# Patient Record
Sex: MALE | Race: BLACK OR AFRICAN AMERICAN | NOT HISPANIC OR LATINO | URBAN - METROPOLITAN AREA
[De-identification: names, ages, dates, MRNs, and addresses within clinical notes are randomized per-mention and may not be internally consistent; named-entity substitution may affect disease eponyms.]

---

## 2017-08-01 ENCOUNTER — TRANSFERRED RECORDS (OUTPATIENT)
Dept: HEALTH INFORMATION MANAGEMENT | Facility: CLINIC | Age: 62
End: 2017-08-01

## 2018-07-17 ENCOUNTER — TRANSFERRED RECORDS (OUTPATIENT)
Dept: HEALTH INFORMATION MANAGEMENT | Facility: CLINIC | Age: 63
End: 2018-07-17

## 2019-09-10 ENCOUNTER — TRANSFERRED RECORDS (OUTPATIENT)
Dept: HEALTH INFORMATION MANAGEMENT | Facility: CLINIC | Age: 64
End: 2019-09-10

## 2021-07-08 ENCOUNTER — TRANSFERRED RECORDS (OUTPATIENT)
Dept: HEALTH INFORMATION MANAGEMENT | Facility: CLINIC | Age: 66
End: 2021-07-08

## 2021-07-08 LAB
ALT SERPL-CCNC: 25 U/L (ref 0–78)
AST SERPL-CCNC: 30 U/L (ref 0–37)

## 2021-07-14 ENCOUNTER — TRANSFERRED RECORDS (OUTPATIENT)
Dept: HEALTH INFORMATION MANAGEMENT | Facility: CLINIC | Age: 66
End: 2021-07-14

## 2021-08-18 ENCOUNTER — OFFICE VISIT (OUTPATIENT)
Dept: FAMILY MEDICINE | Facility: CLINIC | Age: 66
End: 2021-08-18
Payer: MEDICARE

## 2021-08-18 VITALS
OXYGEN SATURATION: 95 % | SYSTOLIC BLOOD PRESSURE: 171 MMHG | BODY MASS INDEX: 34.85 KG/M2 | HEART RATE: 100 BPM | RESPIRATION RATE: 20 BRPM | DIASTOLIC BLOOD PRESSURE: 107 MMHG | WEIGHT: 219.2 LBS | TEMPERATURE: 97.7 F

## 2021-08-18 DIAGNOSIS — Z00.00 PREVENTATIVE HEALTH CARE: ICD-10-CM

## 2021-08-18 DIAGNOSIS — J30.89 SEASONAL ALLERGIC RHINITIS DUE TO OTHER ALLERGIC TRIGGER: ICD-10-CM

## 2021-08-18 DIAGNOSIS — I10 ESSENTIAL HYPERTENSION: Primary | ICD-10-CM

## 2021-08-18 LAB
ALBUMIN SERPL-MCNC: 4.4 G/DL (ref 3.5–5)
ALP SERPL-CCNC: 78 U/L (ref 45–120)
ALT SERPL W P-5'-P-CCNC: 20 U/L (ref 0–45)
ANION GAP SERPL CALCULATED.3IONS-SCNC: 13 MMOL/L (ref 5–18)
AST SERPL W P-5'-P-CCNC: 28 U/L (ref 0–40)
BILIRUB SERPL-MCNC: 0.7 MG/DL (ref 0–1)
BUN SERPL-MCNC: 11 MG/DL (ref 8–22)
CALCIUM SERPL-MCNC: 9.8 MG/DL (ref 8.5–10.5)
CHLORIDE BLD-SCNC: 103 MMOL/L (ref 98–107)
CHOLEST SERPL-MCNC: 227 MG/DL
CO2 SERPL-SCNC: 22 MMOL/L (ref 22–31)
CREAT SERPL-MCNC: 0.91 MG/DL (ref 0.7–1.3)
FASTING STATUS PATIENT QL REPORTED: ABNORMAL
GFR SERPL CREATININE-BSD FRML MDRD: 86 ML/MIN/1.73M2
GLUCOSE BLD-MCNC: 214 MG/DL (ref 70–125)
HBA1C MFR BLD: 6.5 % (ref 0–5.6)
HDLC SERPL-MCNC: 35 MG/DL
LDLC SERPL CALC-MCNC: 147 MG/DL
POTASSIUM BLD-SCNC: 3.9 MMOL/L (ref 3.5–5)
PROT SERPL-MCNC: 8.1 G/DL (ref 6–8)
SODIUM SERPL-SCNC: 138 MMOL/L (ref 136–145)
TRIGL SERPL-MCNC: 226 MG/DL

## 2021-08-18 PROCEDURE — 83036 HEMOGLOBIN GLYCOSYLATED A1C: CPT | Performed by: STUDENT IN AN ORGANIZED HEALTH CARE EDUCATION/TRAINING PROGRAM

## 2021-08-18 PROCEDURE — 80053 COMPREHEN METABOLIC PANEL: CPT | Performed by: STUDENT IN AN ORGANIZED HEALTH CARE EDUCATION/TRAINING PROGRAM

## 2021-08-18 PROCEDURE — 99214 OFFICE O/P EST MOD 30 MIN: CPT | Mod: GC | Performed by: STUDENT IN AN ORGANIZED HEALTH CARE EDUCATION/TRAINING PROGRAM

## 2021-08-18 PROCEDURE — 80061 LIPID PANEL: CPT | Performed by: STUDENT IN AN ORGANIZED HEALTH CARE EDUCATION/TRAINING PROGRAM

## 2021-08-18 PROCEDURE — 36415 COLL VENOUS BLD VENIPUNCTURE: CPT | Performed by: STUDENT IN AN ORGANIZED HEALTH CARE EDUCATION/TRAINING PROGRAM

## 2021-08-18 RX ORDER — FLUTICASONE PROPIONATE 50 MCG
1 SPRAY, SUSPENSION (ML) NASAL DAILY
Qty: 9.9 ML | Refills: 0 | Status: SHIPPED | OUTPATIENT
Start: 2021-08-18 | End: 2021-08-18

## 2021-08-18 RX ORDER — LISINOPRIL 10 MG/1
10 TABLET ORAL DAILY
Qty: 30 TABLET | Refills: 0 | Status: SHIPPED | OUTPATIENT
Start: 2021-08-18 | End: 2021-08-18

## 2021-08-18 RX ORDER — CETIRIZINE HYDROCHLORIDE 10 MG/1
10 TABLET ORAL DAILY
Qty: 90 TABLET | Refills: 1 | Status: SHIPPED | OUTPATIENT
Start: 2021-08-18

## 2021-08-18 RX ORDER — FLUTICASONE PROPIONATE 50 MCG
1 SPRAY, SUSPENSION (ML) NASAL DAILY
Qty: 9.9 ML | Refills: 0 | Status: SHIPPED | OUTPATIENT
Start: 2021-08-18 | End: 2021-11-24

## 2021-08-18 RX ORDER — CETIRIZINE HYDROCHLORIDE 10 MG/1
10 TABLET ORAL DAILY
Qty: 90 TABLET | Refills: 1 | Status: SHIPPED | OUTPATIENT
Start: 2021-08-18 | End: 2021-08-18

## 2021-08-18 RX ORDER — LISINOPRIL 10 MG/1
10 TABLET ORAL DAILY
Qty: 30 TABLET | Refills: 0 | Status: SHIPPED | OUTPATIENT
Start: 2021-08-18 | End: 2021-09-03

## 2021-08-18 RX ORDER — TENOFOVIR DISOPROXIL FUMARATE 300 MG/1
300 TABLET, FILM COATED ORAL
COMMUNITY

## 2021-08-18 NOTE — PROGRESS NOTES
Preceptor Attestation:    I discussed the patient with the resident and evaluated the patient in person. I have verified the content of the note, which accurately reflects my assessment of the patient and the plan of care.   Supervising Physician:  Garrison Willingham MD.

## 2021-08-18 NOTE — PROGRESS NOTES
[facility] Clinic Visit    Assessment & Plan   1. Essential hypertension  Pulse elevated, will do lisinopril instead of amlodipine today. Getting baseline kidney function. Of note, has a RUSB systolic murmur on exam.   - Comprehensive metabolic panel; Future  - lisinopril (ZESTRIL) 10 MG tablet; Take 1 tablet (10 mg) by mouth daily  Dispense: 30 tablet; Refill: 0    2. Preventative health care  - Lipid Profile; Future  - Hemoglobin A1c; Future    3. Seasonal allergic rhinitis due to other allergic trigger  Symptoms exacerbated by dust/grass.   - fluticasone (FLONASE) 50 MCG/ACT nasal spray; Spray 1 spray into both nostrils daily  Dispense: 9.9 mL; Refill: 0  - cetirizine (ZYRTEC) 10 MG tablet; Take 1 tablet (10 mg) by mouth daily  Dispense: 90 tablet; Refill: 1         Options for treatment and follow-up care were reviewed with the patient who was engaged and actively involved in the decision making process, verbalized understanding of the options discussed, and satisfied with the final plan.    Patient was staffed with supervising physician, Dr. Willingham.     Asha Champion MD, PGY3  [facility]    Subjective   Anny Dumont is a 66 year old male with a history including HTN, obesity, HBV, who presents for BP follow up.    HTN - Patient used to take BP medication, but then was able to get off of his medication with diet/exercise changes. He was told at his GI appt his blood pressure was high and to follow up with his PCP. He is having headaches, but no dizziness. No changes in vision. No chest pain. He has never smoked. No alcohol use.     Forms given to comment if I give permission to participate in lift test including 100 lbs for [place] [facility].     Also reporting some sneezing, nasal congestion when he cleans/vacuums or cuts the grass. No body aches or sore throat.       Patient Active Problem List    Diagnosis Date Noted     Hepatitis B virus infection 01/03/2013      Priority: High     On Tenofovir.  Viral load ever 4 months.  Follow with [facility].  Problem list name updated by automated process. Provider to review       Essential hypertension 12/09/2016     Priority: Medium     Hyperglycemia 12/09/2016     Priority: Medium     A1c=5.6 on 12/9/16.       Obesity 09/24/2015     Priority: Medium     Other ascites 01/03/2013     Priority: Low     Abdomen ascities        Backache 01/03/2013     Priority: Low     Problem list name updated by automated process. Provider to review       Health Care Home 01/03/2013     Priority: Low     Tier Level: 1   DX V65.8 REPLACED WITH [id number] HEALTH CARE HOME (04/08/2013)         Social: He reports that he has never smoked. He has never used smokeless tobacco. He reports that he does not drink alcohol and does not use drugs.    There are no exam notes on file for this visit.    Objective   There were no vitals filed for this visit.  There is no height or weight on file to calculate BMI.    GEN: NAD, healthy, alert  Constitutional: Awake, alert, cooperative, no acute distress, and appears stated age.  HEENT: NC/AT, EOMI, normal conjunctivae/sclerae, mask on  Lungs: No increased WOB. CTABL, no crackles or wheezing appreciated.  Cardiovascular: Appears well perfused. RRR, normal S1 and S2, no S3 or S4; 2/6 systolic murmur heard best at RUSB  Abdomen: Normal BS, soft, non-distended, non-tender, no masses palpated  Musculoskeletal: No redness, warmth, or swelling of the joints. Tone is normal.  Neurologic: A&Ox3.  Cranial nerves II-XII are grossly intact.  Sensory is intact and gait is normal.  Neuropsychiatric: Normal affect, mood, orientation, memory and insight.  Skin: No visible rashes, erythema, pallor, petechia or purpura.    Labs:  No visits with results within 1 Month(s) from this visit.   Latest known visit with results is:   Transferred Records on 07/08/2021   Component Date Value Ref Range Status     ALT (External) 07/08/2021 25  0 - 78 U/L Final      AST (External) 07/08/2021 30  0 - 37 U/L Final

## 2021-08-20 ENCOUNTER — TELEPHONE (OUTPATIENT)
Dept: FAMILY MEDICINE | Facility: CLINIC | Age: 66
End: 2021-08-20

## 2021-09-03 ENCOUNTER — OFFICE VISIT (OUTPATIENT)
Dept: FAMILY MEDICINE | Facility: CLINIC | Age: 66
End: 2021-09-03
Payer: MEDICARE

## 2021-09-03 VITALS
SYSTOLIC BLOOD PRESSURE: 161 MMHG | RESPIRATION RATE: 20 BRPM | OXYGEN SATURATION: 98 % | BODY MASS INDEX: 34.5 KG/M2 | WEIGHT: 217 LBS | DIASTOLIC BLOOD PRESSURE: 93 MMHG | HEART RATE: 82 BPM | TEMPERATURE: 98.4 F

## 2021-09-03 DIAGNOSIS — I10 ESSENTIAL HYPERTENSION: Primary | ICD-10-CM

## 2021-09-03 DIAGNOSIS — E78.5 HYPERLIPIDEMIA LDL GOAL <100: ICD-10-CM

## 2021-09-03 DIAGNOSIS — E11.9 TYPE 2 DIABETES MELLITUS WITHOUT COMPLICATION, WITHOUT LONG-TERM CURRENT USE OF INSULIN (H): ICD-10-CM

## 2021-09-03 PROCEDURE — 99214 OFFICE O/P EST MOD 30 MIN: CPT | Mod: GC | Performed by: STUDENT IN AN ORGANIZED HEALTH CARE EDUCATION/TRAINING PROGRAM

## 2021-09-03 RX ORDER — LISINOPRIL 10 MG/1
10 TABLET ORAL DAILY
Qty: 30 TABLET | Refills: 3 | Status: SHIPPED | OUTPATIENT
Start: 2021-09-03 | End: 2021-10-20

## 2021-09-03 NOTE — PROGRESS NOTES
[facility] Medicine Clinic Visit    Assessment & Plan     1. Essential hypertension  Blood pressure elevated x2 on exam today.  Unfortunately he did not take his lisinopril this morning prior to his blood pressure check.  He does not have a home monitor.  Will prescribe a DME and have him write down his blood pressures at home twice a day.  He will take his blood pressure every morning.  I instructed him to call our clinic next week with his numbers and speak to our RN.  Based on these results we may uptitrate his medication.  Renal function within normal limits so room to increase the lisinopril.  - Home Blood Pressure Monitor Order for DME - ONLY FOR DME  - lisinopril (ZESTRIL) 10 MG tablet; Take 1 tablet (10 mg) by mouth daily  Dispense: 30 tablet; Refill: 3    2. Type 2 diabetes mellitus without complication, without long-term current use of insulin (H)  Discussed elevated A1c and glucose during last visit consistent with type 2 diabetes.  He does drink quite a bit of sugared soda but is working on cutting back.  In addition has been increasing his exercise.  Will consider diabetes education referral in the future.  Recheck in 3 months and consider starting medication such as Metformin or GLP-1 at this time.    3. Hyperlipidemia LDL goal <100  Lipid panel notable for hyperlipidemia today.  With history of hypertension and type 2 diabetes high intensity statin is recommended.  He wants to work on diet and exercise before starting a statin.  We discussed his ASCVD risk at length, even if his blood pressure is controlled he is still at a risk of 31% in the next 10 years of an cardiac event due to his conditions.  He does not want to start a statin due to his chronic hepatitis B as well, would like to discuss with his GI doctor.  We will follow-up in 3 months for recheck and consider starting medication at that time.  She touch base with GI in the meantime.    RTC in 3 months for follow up of lipids, diabetes  and HTN or sooner if develops new or worsening symptoms.      Patient Instructions   Take your blood pressure medication every day. I want you to take your blood pressure medicine and then take your blood pressures with the new home monitor about 2 x a day. Write down these numbers and bring them to clinic in two weeks. I will also have someone call to check in with you.    [facility]    Address: [address]  [address] [place] [address]    Phone: [phone]  Fax: [phone]    We talked about the high blood sugar and type II diabetes today. Keep up the good work not drinking soda. This is important to keep your blood sugar down. Increase your time on the exercise machine to 5 times a week for 30 minutes.    Please consider starting a cholesterol medication. With diabetes and high blood pressure you are at risk for heart disease. We should talk to your GI team about this as well.    Please take your blood pressure medication prior to coming to your next visit. Recheck in 3 months to check cholesterol.    Please call my nurse Mignon on Tuesday 9/7/2021 and report your blood pressures.    Nice to meet you today!      Options for treatment and follow-up care were reviewed with the patient who was engaged and actively involved in the decision making process, verbalized understanding of the options discussed, and satisfied with the final plan.    Patient was staffed with supervising physician, Dr. Johns, who agrees with the assessment and plan.    Peri Johnson MD, PGY3  [facility] Medicine    Subjective   Anny Dumont is a 66 year old male with a history including Hepatitis B infection, essential hypertension, hyperglycemia who presents today for BP check and laboratory data follow up.    Last seem by Dr. Champion on 8/18/2021. At this time blood pressure and pulse elevated to 171/107 mmHg and 100 bpm. Had been off of prior amlodipine. Restarted on  lisinopril 10 mg and instructed to follow up today. Laboratory data also notable for elevated glucose to 214 and A1C of 6.5%. Lipid panel also elevated (see below).    Overall feels much better since starting his blood pressure medication. His headaches have resolved. He has no changes in vision, his dry eye is improved. No CP or SOB. No nausea or vomiting.  He does not have any concerns regarding his medication regimen.  He did not take his medicines this morning and is concerned that his blood pressure is elevated here in clinic.    The 10-year ASCVD risk score (Bernardpablo CANCINO Jr., et al., 2013) is: 48.9%    Values used to calculate the score:      Age: 66 years      Sex: Male      Is Non- : Yes      Diabetic: Yes      Tobacco smoker: No      Systolic Blood Pressure: 161 mmHg      Is BP treated: Yes      HDL Cholesterol: 35 mg/dL      Total Cholesterol: 227 mg/dL  This is likely falsely elevated due to not taking BP medication this AM. However, suspect statin still recommended due to history of HTN and now diabetes. If BP within normal limits () ASCVD risk decreases to 31.8%.    Patient Active Problem List    Diagnosis Date Noted     Hepatitis B virus infection 01/03/2013     Priority: High     On Tenofovir.  Viral load ever 4 months.  Follow with [facility].  Problem list name updated by automated process. Provider to review       Essential hypertension 12/09/2016     Priority: Medium     Hyperglycemia 12/09/2016     Priority: Medium     A1c=5.6 on 12/9/16.       Obesity 09/24/2015     Priority: Medium     Other ascites 01/03/2013     Priority: Low     Abdomen ascities        Backache 01/03/2013     Priority: Low     Problem list name updated by automated process. Provider to review       Health Care Home 01/03/2013     Priority: Low     Tier Level: 1   DX V65.8 REPLACED WITH [id number] HEALTH CARE HOME (04/08/2013)         Current Outpatient Medications   Medication Instructions     cetirizine  (ZYRTEC) 10 mg, Oral, DAILY     fluticasone (FLONASE) 50 MCG/ACT nasal spray 1 spray, Both Nostrils, DAILY     lisinopril (ZESTRIL) 10 mg, Oral, DAILY     tenofovir (VIREAD) 300 mg, Oral     vitamin  B complex with vitamin C (VITAMIN  B COMPLEX) TABS 1 tablet, Oral, DAILY       Social: He reports that he has never smoked. He has never used smokeless tobacco. He reports that he does not drink alcohol and does not use drugs.    There are no exam notes on file for this visit.    Objective     Vitals:    09/03/21 1013 09/03/21 1017   BP: (!) 159/93 (!) 161/93   Pulse: 82    Resp: 20    Temp: 98.4  F (36.9  C)    TempSrc: Oral    SpO2: 98%    Weight: 98.4 kg (217 lb)      Body mass index is 34.5 kg/m .    GEN: NAD, healthy, alert  HEENT: NC/AT, EOMI, normal conjunctivae/sclerae, clear oropharynx, MMM  RESP: CTAB, no w/r/r  CV: RRR, nl S1/S2, no m/r/g, no peripheral edema  ABD: soft, NT/ND, +BS throughout  MSK: no MSK defects noted, gait is age appropriate w/o ataxia  SKIN: no suspicious lesions or rashes  NEURO: no obvious focal deficits  PSYCH: mentation appears normal, affect normal/bright    Labs:  Office Visit on 08/18/2021   Component Date Value Ref Range Status     Cholesterol 08/18/2021 227* <=199 mg/dL Final     Triglycerides 08/18/2021 226* <=149 mg/dL Final     Direct Measure HDL 08/18/2021 35* >=40 mg/dL Final    HDL Cholesterol Reference Range:     0-2 years:   No reference ranges established for patients under 2 years old  at [facility] for lipid analytes.    2-8 years:  Greater than 45 mg/dL     18 years and older:   Female: Greater than or equal to 50 mg/dL   Male:   Greater than or equal to 40 mg/dL     LDL Cholesterol Calculated 08/18/2021 147* <=129 mg/dL Final     Patient Fasting > 8hrs? 08/18/2021 Unknown   Final     Hemoglobin A1C 08/18/2021 6.5* 0.0 - 5.6 % Final    Normal <5.7%   Prediabetes 5.7-6.4%    Diabetes 6.5% or higher     Note: Adopted from ADA consensus guidelines.     Sodium  08/18/2021 138  136 - 145 mmol/L Final     Potassium 08/18/2021 3.9  3.5 - 5.0 mmol/L Final     Chloride 08/18/2021 103  98 - 107 mmol/L Final     Carbon Dioxide (CO2) 08/18/2021 22  22 - 31 mmol/L Final     Anion Gap 08/18/2021 13  5 - 18 mmol/L Final     Urea Nitrogen 08/18/2021 11  8 - 22 mg/dL Final     Creatinine 08/18/2021 0.91  0.70 - 1.30 mg/dL Final     Calcium 08/18/2021 9.8  8.5 - 10.5 mg/dL Final     Glucose 08/18/2021 214* 70 - 125 mg/dL Final     Alkaline Phosphatase 08/18/2021 78  45 - 120 U/L Final     AST 08/18/2021 28  0 - 40 U/L Final     ALT 08/18/2021 20  0 - 45 U/L Final     Protein Total 08/18/2021 8.1* 6.0 - 8.0 g/dL Final     Albumin 08/18/2021 4.4  3.5 - 5.0 g/dL Final     Bilirubin Total 08/18/2021 0.7  0.0 - 1.0 mg/dL Final     GFR Estimate 08/18/2021 86  >60 mL/min/1.73m2 Final    As of July 11, 2021, eGFR is calculated by the CKD-EPI creatinine equation, without race adjustment. eGFR can be influenced by muscle mass, exercise, and diet. The reported eGFR is an estimation only and is only applicable if the renal function is stable.

## 2021-09-03 NOTE — PROGRESS NOTES
Preceptor Attestation:    I discussed the patient with the resident and evaluated the patient in person. I have verified the content of the note, which accurately reflects my assessment of the patient and the plan of care.   Supervising Physician:  Ritesh Johns MD.

## 2021-09-03 NOTE — PATIENT INSTRUCTIONS
Take your blood pressure medication every day. I want you to take your blood pressure medicine and then take your blood pressures with the new home monitor about 2 x a day. Write down these numbers and bring them to clinic in two weeks. I will also have someone call to check in with you.    [facility]    Address: [address]  [address] [place] [address]    Phone: [phone]  Fax: [phone]    We talked about the high blood sugar and type II diabetes today. Keep up the good work not drinking soda. This is important to keep your blood sugar down. Increase your time on the exercise machine to 5 times a week for 30 minutes.    Please consider starting a cholesterol medication. With diabetes and high blood pressure you are at risk for heart disease. We should talk to your GI team about this as well.    Please take your blood pressure medication prior to coming to your next visit. Recheck in 3 months to check cholesterol.    Please call my nurse Mignon on Tuesday 9/7/2021 and report your blood pressures.    Nice to meet you today!

## 2021-11-24 ENCOUNTER — OFFICE VISIT (OUTPATIENT)
Dept: FAMILY MEDICINE | Facility: CLINIC | Age: 66
End: 2021-11-24
Payer: MEDICARE

## 2021-11-24 VITALS
RESPIRATION RATE: 18 BRPM | BODY MASS INDEX: 31.96 KG/M2 | TEMPERATURE: 98.2 F | SYSTOLIC BLOOD PRESSURE: 125 MMHG | HEART RATE: 103 BPM | WEIGHT: 201 LBS | OXYGEN SATURATION: 96 % | DIASTOLIC BLOOD PRESSURE: 81 MMHG

## 2021-11-24 DIAGNOSIS — R51.9 CHRONIC INTRACTABLE HEADACHE, UNSPECIFIED HEADACHE TYPE: ICD-10-CM

## 2021-11-24 DIAGNOSIS — Z23 NEED FOR VACCINATION: ICD-10-CM

## 2021-11-24 DIAGNOSIS — I10 ESSENTIAL HYPERTENSION: ICD-10-CM

## 2021-11-24 DIAGNOSIS — G89.29 CHRONIC INTRACTABLE HEADACHE, UNSPECIFIED HEADACHE TYPE: ICD-10-CM

## 2021-11-24 DIAGNOSIS — J30.89 ALLERGIC RHINITIS DUE TO OTHER ALLERGIC TRIGGER, UNSPECIFIED SEASONALITY: Primary | ICD-10-CM

## 2021-11-24 PROBLEM — E11.9 DIABETES MELLITUS, TYPE 2 (H): Status: ACTIVE | Noted: 2021-11-24

## 2021-11-24 PROCEDURE — G0009 ADMIN PNEUMOCOCCAL VACCINE: HCPCS | Performed by: STUDENT IN AN ORGANIZED HEALTH CARE EDUCATION/TRAINING PROGRAM

## 2021-11-24 PROCEDURE — 91306 COVID-19,PF,MODERNA (18+ YRS BOOSTER .25ML): CPT | Performed by: STUDENT IN AN ORGANIZED HEALTH CARE EDUCATION/TRAINING PROGRAM

## 2021-11-24 PROCEDURE — 99214 OFFICE O/P EST MOD 30 MIN: CPT | Mod: GC | Performed by: STUDENT IN AN ORGANIZED HEALTH CARE EDUCATION/TRAINING PROGRAM

## 2021-11-24 PROCEDURE — G0008 ADMIN INFLUENZA VIRUS VAC: HCPCS | Performed by: STUDENT IN AN ORGANIZED HEALTH CARE EDUCATION/TRAINING PROGRAM

## 2021-11-24 PROCEDURE — 90662 IIV NO PRSV INCREASED AG IM: CPT | Performed by: STUDENT IN AN ORGANIZED HEALTH CARE EDUCATION/TRAINING PROGRAM

## 2021-11-24 PROCEDURE — 90732 PPSV23 VACC 2 YRS+ SUBQ/IM: CPT | Performed by: STUDENT IN AN ORGANIZED HEALTH CARE EDUCATION/TRAINING PROGRAM

## 2021-11-24 PROCEDURE — 0064A COVID-19,PF,MODERNA (18+ YRS BOOSTER .25ML): CPT | Performed by: STUDENT IN AN ORGANIZED HEALTH CARE EDUCATION/TRAINING PROGRAM

## 2021-11-24 RX ORDER — FLUTICASONE PROPIONATE 50 MCG
2 SPRAY, SUSPENSION (ML) NASAL DAILY
Qty: 15.8 ML | Refills: 1 | Status: SHIPPED | OUTPATIENT
Start: 2021-11-24

## 2021-11-24 RX ORDER — ACETAMINOPHEN 500 MG
500-1000 TABLET ORAL 2 TIMES DAILY PRN
Qty: 60 TABLET | Refills: 1 | Status: SHIPPED | OUTPATIENT
Start: 2021-11-24

## 2021-11-24 RX ORDER — LISINOPRIL 10 MG/1
10 TABLET ORAL DAILY
Qty: 90 TABLET | Refills: 3 | Status: SHIPPED | OUTPATIENT
Start: 2021-11-24 | End: 2023-02-07

## 2021-11-24 NOTE — PROGRESS NOTES
Chief Complaint   Patient presents with     Headache     headache for very long but getting very bad this last 2 days. mostly on the left side       There are no exam notes on file for this visit.        Assessment/Plan:  Anny Dumont is a 66 year old male here for recent worsening of a left sided headache over the past 1 week, with acute flare up on chronic left-sided headaches.  Patient describes worsening of headaches when exposed to reji environments such as at work and has evidence of boggy turbinates consistent with allergic rhinitis on exam.  No maxillary or frontal sinus tenderness suggestive of acute sinusitis and nasal discharge is described as clear rhinorrhea to light yellow.  He has no red flag signs such as new headache, positional headache, or worsening of headache with increased intracranial pressure such as with coughing, sneezing, recumbent positioning.  The patient is using no medication for allergies at this time so we made a plan to initiate Flonase 2 sprays into each nostril daily, which will then be tapered to 1 spray in each nostril daily with the option to switch to every other day dosing in the case of epistaxis.  The patient may also use Tylenol 500 to 1000 mg up to 2 times daily.  I encouraged him to increase sleep as he only sleeps about 5 hours per night and to follow-up in this clinic if his headaches are not substantially improved in the next 2 weeks.    Anny was seen today for headache.    Diagnoses and all orders for this visit:    Allergic rhinitis due to other allergic trigger, unspecified seasonality  -     fluticasone (FLONASE) 50 MCG/ACT nasal spray; Spray 2 sprays into both nostrils daily    Chronic intractable headache, unspecified headache type  -     acetaminophen (TYLENOL) 500 MG tablet; Take 1-2 tablets (500-1,000 mg) by mouth 2 times daily as needed for other (Headache)    Essential hypertension  -     lisinopril (ZESTRIL) 10 MG tablet; Take 1 tablet (10 mg) by  mouth daily    Need for vaccination  -     COVID-19,PF,MODERNA (18+ YRS BOOSTER .25ML)  -     PNEUMOCOCCAL 23 VALENT  -     FLU VACCINE, INCREASED ANTIGEN, PRESV FREE          Future Appointments   Date Time Provider Department Center   11/26/2021 10:00 AM SPBT COVID VACCINE MODERNA [facility]       Rosita Freed MD      Patient Instructions   Take flonase 2 sprays in each side daily for one week, then decrease to 1 spray in each side daily.  If you get nosebleeds, switch to every other day usage.  Remember to point the nozzle toward the ear on the same side.     Patient Education     Allergic Rhinitis        Subjective:  Anny Dumont is a 66 year old male here for worsening headaches.  They have been going on for 11 years but stopped for a while. bad for 1-2 years, especially with dust.   Even his left eye is in pain.  Feels headache in left frontal - forehead and cheek. Sometimes with stuffy nose.  Has a continuous headache. Before it came and went but now this week it is constant.   Dust or bad smells make it come on, perfumes. Driving [place] with cold - heater going.  Sometimes uses traditional medication. Chinese medicine using on the outside of his nose which does not help.  Dayquil sometimes helps.    He went to and ENT 11 years ago who gave him medication that helped his headache.    Patient has not been sleeping enough.  No time to rest. Does not sleep more than 5 hours. Works two days in the morning and 5 days at night.  He comes home and goes to sleep, then has to get up early to get his children to their various schools.  He sometimes naps after he comes home from dropping the kids off at school.  When reading has more difficulty reading, from his left eye only.  Has purchased cheaters from a local drugstore.  He has seen an ophthalmologist for this concern.  No nosebleeds.  Denies tearing or ringing in ears.  Denies phono/photophobia.  Headache is not worsened with cough, sneezing, recumbent  positioning.          Patient Active Problem List   Diagnosis     Other ascites     Hepatitis B virus infection     Backache     Health Care Home     Obesity     Essential hypertension     Hyperglycemia     Diabetes mellitus, type 2 (H)       Current Outpatient Medications   Medication     acetaminophen (TYLENOL) 500 MG tablet     cetirizine (ZYRTEC) 10 MG tablet     fluticasone (FLONASE) 50 MCG/ACT nasal spray     lisinopril (ZESTRIL) 10 MG tablet     tenofovir (VIREAD) 300 MG tablet     No current facility-administered medications for this visit.       Objective:  /81 (BP Location: Left arm, Patient Position: Sitting, Cuff Size: Adult Regular)   Pulse 103   Temp 98.2  F (36.8  C) (Oral)   Resp 18   Wt 91.2 kg (201 lb)   SpO2 96%   BMI 31.96 kg/m    Body mass index is 31.96 kg/m .  Gen: A/O x3, in NAD.  HEENT: Facial structure symmetric.  Mild scleral injection bilaterally.  With bilateral pale and boggy turbinates.  No tenderness with percussion of frontal or maxillary sinuses bilaterally.  Oropharynx with erythematous tonsillar pillars.  Cardio: S1, S2, no MRG. RRR.  Resp: CTAB, no WRR.  Neuro: Grossly intact.

## 2021-11-24 NOTE — PATIENT INSTRUCTIONS
Take flonase 2 sprays in each side daily for one week, then decrease to 1 spray in each side daily.  If you get nosebleeds, switch to every other day usage.  Remember to point the nozzle toward the ear on the same side.     Patient Education     Allergic Rhinitis  Allergic rhinitis is an allergic reaction that affects the nose, and often the eyes. It s often known as nasal allergies. Nasal allergies are often due to things in the environment that are breathed in. Depending what you are sensitive to, nasal allergies may occur only during certain seasons, or they may occur year round. Common indoor allergens include house dust mites, mold, cockroaches, and pet dander. Outdoor allergens include pollen from trees, grasses, and weeds.    Symptoms include a drippy, stuffy, and itchy nose. They also include sneezing and red and itchy eyes. You may feel tired more often. Severe allergies may also affect your breathing and trigger a condition called asthma.    Tests can be done to see what allergens are affecting you. You may be referred to an allergy specialist for testing and further evaluation.   Home care  Your healthcare provider may prescribe medicines to help relieve allergy symptoms. These may include oral medicines, nasal sprays, or eye drops.   Ask your provider for advice on how to stay away from substances that you are allergic to. Below are a few tips for each type of allergen.   Pet dander:    Do not have pets with fur and feathers.    If you have a pet, keep it out of your bedroom and off upholstered furniture.  Pollen:    When pollen counts are high, keep windows of your car and home closed. If possible, use an air conditioner instead.    Wear a filter mask when mowing or doing yard work.  House dust mites:    Wash bedding every week in warm water and detergent and dry on a hot setting.    Cover the mattress, box spring, and pillows with allergy covers.     If possible, sleep in a room with no carpet,  curtains, or upholstered furniture.  Cockroaches:    Store food in sealed containers.    Remove garbage from the home promptly.    Fix water leaks.  Mold:    Keep humidity low by using a dehumidifier or air conditioner. Keep the dehumidifier and air conditioner clean and free of mold.    Clean moldy areas with bleach and water. Don't mix bleach with other .  In general:    Vacuum once or twice a week. If possible, use a vacuum with a high-efficiency particulate air (HEPA) filter.    Don't smoke. Stay away from cigarette smoke. Cigarette smoke is an irritant that can make symptoms worse.  Follow-up care  Follow up as advised by the healthcare provider or our staff. If you were referred to an allergy specialist, make this appointment promptly.   When to seek medical advice  Call your healthcare provider or get medical care right away if the following occur:     Coughing    Fever of 100.4 F (38 C) or higher, or as directed by your healthcare provider    Raised red bumps (hives)    Continuing symptoms, new symptoms, or worsening symptoms  Call 911  Call 911 if you have:     Trouble breathing    Severe swelling of the face or severe itching of the eyes or mouth    Wheezing or shortness of breath    Chest tightness    Dizziness or lightheadedness    Feeling of doom    Stomach pain, bloating, vomiting, or diarrhea  [employer] last reviewed this educational content on 10/1/2019    1429-5050 [employer]. All rights reserved. This information is not intended as a substitute for professional medical care. Always follow your healthcare professional's instructions.

## 2021-11-24 NOTE — LETTER
RETURN TO WORK/SCHOOL FORM    11/24/2021    Re: Anny Dumont  1955      To Whom It May Concern:     Anny Dumont was seen in clinic today..  He may return to work without restrictions on 11/25/21          Restrictions:  None      Rosita Freed MD  11/24/2021 12:06 PM

## 2023-01-26 ENCOUNTER — TRANSFERRED RECORDS (OUTPATIENT)
Dept: HEALTH INFORMATION MANAGEMENT | Facility: CLINIC | Age: 68
End: 2023-01-26
Payer: MEDICARE

## 2023-01-26 LAB
ALT SERPL-CCNC: 25 IU/L (ref 0–44)
AST SERPL-CCNC: 35 IU/L (ref 0–40)
CREATININE (EXTERNAL): 0.74 MG/DL (ref 0.76–1.27)
GFR ESTIMATED (EXTERNAL): 97 ML/MIN/1.73

## 2023-02-02 ENCOUNTER — TRANSFERRED RECORDS (OUTPATIENT)
Dept: HEALTH INFORMATION MANAGEMENT | Facility: CLINIC | Age: 68
End: 2023-02-02
Payer: MEDICARE

## 2023-05-11 DIAGNOSIS — I10 ESSENTIAL HYPERTENSION: ICD-10-CM

## 2023-05-11 RX ORDER — LISINOPRIL 10 MG/1
10 TABLET ORAL DAILY
Qty: 30 TABLET | Refills: 0 | Status: SHIPPED | OUTPATIENT
Start: 2023-05-11 | End: 2023-05-15

## 2023-05-11 NOTE — TELEPHONE ENCOUNTER
Med refill on the lisinopril and he wants to change his [facility] to [facility] in [place] [id number] [address] [phone]

## 2023-07-20 DIAGNOSIS — I10 ESSENTIAL HYPERTENSION: ICD-10-CM

## 2023-07-20 RX ORDER — LISINOPRIL 10 MG/1
10 TABLET ORAL DAILY
Qty: 30 TABLET | Refills: 0 | Status: SHIPPED | OUTPATIENT
Start: 2023-07-20 | End: 2023-08-16

## 2023-07-20 NOTE — TELEPHONE ENCOUNTER
[facility] phone call message- patient requesting a refill:    Full Medication Name:     lisinopril (ZESTRIL) 10 MG tablet    Dose:     Sig: TAKE 1 TABLET(10 MG) BY MOUTH DAILY      Pharm[facility] confirmed as   [id number] [place] - [address] AT [facility]  [address] [place] [address]  Phone: [phone] Fax: [phone]  : Yes    Additional Comments: None     OK to leave a message on voice mail? Yes    Primary language: English      needed? No    Call taken on July 20, 2023 at 10:45 AM by Linda Silver

## 2023-08-16 DIAGNOSIS — I10 ESSENTIAL HYPERTENSION: ICD-10-CM

## 2023-08-16 RX ORDER — LISINOPRIL 10 MG/1
10 TABLET ORAL DAILY
Qty: 30 TABLET | Refills: 0 | Status: SHIPPED | OUTPATIENT
Start: 2023-08-16 | End: 2023-09-19

## 2023-09-16 DIAGNOSIS — I10 ESSENTIAL HYPERTENSION: ICD-10-CM

## 2023-09-19 RX ORDER — LISINOPRIL 10 MG/1
10 TABLET ORAL DAILY
Qty: 14 TABLET | Refills: 0 | Status: SHIPPED | OUTPATIENT
Start: 2023-09-19

## 2024-07-25 ENCOUNTER — TRANSFERRED RECORDS (OUTPATIENT)
Dept: HEALTH INFORMATION MANAGEMENT | Facility: CLINIC | Age: 69
End: 2024-07-25
Payer: MEDICARE

## 2024-07-25 LAB
ALT SERPL-CCNC: 19 IU/L (ref 0–44)
AST SERPL-CCNC: 26 IU/L (ref 0–40)
CREATININE (EXTERNAL): 0.77 MG/DL (ref 0.76–1.27)
GFR ESTIMATED (EXTERNAL): 96 ML/MIN/1.73
GLUCOSE (EXTERNAL): 81 MG/DL (ref 70–99)
INR (EXTERNAL): 1.1 (ref 0.9–1.2)
POTASSIUM (EXTERNAL): 4.3 MMOL/L (ref 3.5–5.2)

## 2024-12-05 ENCOUNTER — TRANSFERRED RECORDS (OUTPATIENT)
Dept: HEALTH INFORMATION MANAGEMENT | Facility: CLINIC | Age: 69
End: 2024-12-05
Payer: MEDICARE

## 2024-12-05 LAB
ALT SERPL-CCNC: 28 IU/L (ref 0–44)
AST SERPL-CCNC: 34 IU/L (ref 0–40)
CREATININE (EXTERNAL): 0.81 MG/DL (ref 0.76–1.27)
GFR ESTIMATED (EXTERNAL): 94 ML/MIN/1.73